# Patient Record
Sex: MALE | Race: WHITE
[De-identification: names, ages, dates, MRNs, and addresses within clinical notes are randomized per-mention and may not be internally consistent; named-entity substitution may affect disease eponyms.]

---

## 2017-10-31 ENCOUNTER — HOSPITAL ENCOUNTER (EMERGENCY)
Dept: HOSPITAL 45 - C.EDB | Age: 22
Discharge: HOME | End: 2017-10-31
Payer: COMMERCIAL

## 2017-10-31 VITALS
BODY MASS INDEX: 19.69 KG/M2 | HEIGHT: 74.02 IN | HEIGHT: 74.02 IN | WEIGHT: 153.44 LBS | BODY MASS INDEX: 19.69 KG/M2 | WEIGHT: 153.44 LBS

## 2017-10-31 VITALS — TEMPERATURE: 97.34 F

## 2017-10-31 VITALS — DIASTOLIC BLOOD PRESSURE: 86 MMHG | OXYGEN SATURATION: 100 % | SYSTOLIC BLOOD PRESSURE: 133 MMHG | HEART RATE: 71 BPM

## 2017-10-31 DIAGNOSIS — S52.102A: ICD-10-CM

## 2017-10-31 DIAGNOSIS — F90.9: ICD-10-CM
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
## 2017-10-31 DIAGNOSIS — Z79.899: ICD-10-CM
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
## 2017-10-31 DIAGNOSIS — S52.002A: Primary | ICD-10-CM

## 2017-10-31 NOTE — EMERGENCY ROOM VISIT NOTE
ED Visit Note


First contact with patient:  21:02


CHIEF COMPLAINT: "I broke my left forearm"





HISTORY OF PRESENT ILLNESS: This 22-year-old male patient presents to the 

emergency department, ambulatory, with 2 friends, complaining of pain in the 

left forearm.  The patient states he was playing ultimate [employer] approximately 

30 minutes PTA when his feet slid out from under him.  He states he landed on 

his left arm, describes it as a FOOSH injury.  The patient is able to move 

their wrist. The patient states the pain is severe on palpation and with 

attempts to move and 9/10. No laceration, no weakness. No numbness or tingling. 

The patient denies any other injury. The patient is able to move their fingers 

and elbow without difficulty. The patient has not had a previous fracture to 

this wrist or forearm. The patient has taken nothing for the pain. 





REVIEW OF SYSTEMS: A 6 system review of systems was performed with positives 

and pertinent negatives in the HPI. 





ALLERGIES: None





MEDICATIONS: Concerta, Ritalin





PMH: ADHD


 


SOCIAL HISTORY: The patient lives locally.  He is a [employer] student.  He 

lives with his roommates.  The patient denies drug, alcohol, tobacco use





PHYSICAL EXAM: Vital Signs: Reviewed Nurse's notes, vital signs stable. GENERAL

: Is is a 22-year-old white male, in no acute distress, but appears to be in 

pain, well-developed, well-nourished. NEURO: Alert and oriented to person place 

and time. Normal sensation to light and sharp touch. MUSCULOSKELETAL: There is 

no deformity of the left forearm. There is tenderness and edema over the mid 

shaft of the radius and ulna. There is no snuff box tenderness. Range of motion 

is limited due to pain. There is no tenderness of the elbow, hand or fingers. 

 strength 4/5. Radial pulse 2+. SKIN: Normal and intact. The hand is warm 

and well perfused with capillary refill less than 2 seconds. 





RADIOLOGY:


X-Ray Left Forearm:


L FOREARM 2 VIEWS ROUTINE





HISTORY:  22 years-old Male left forearm pain, possible fracture acute left


forearm pain status post injury.





COMPARISON: None available





TECHNIQUE: 3 views of the left forearm





FINDINGS: 


Acute comminuted fracture of the proximal diaphyseal radius is noted with


butterfly fracture fragment displaced medially x 3 mm and dorsally x 3 mm. There


is 4 mm medial displacement of the distal fracture fragment with approximately 2


mm distraction. Acute minimally comminuted fracture of the mid diaphyseal ulna


also noted with apex lateral angulation of 5 degrees, lateral displacement of 3


mm and dorsal displacement 5 mm. 6 mm positive ulnar variance.





Mild soft tissue swelling of the mid forearm. No opaque foreign body. 





IMPRESSION: 


1. Acute comminuted fracture of the proximal diaphyseal radius with mild


displacement as above.


2. Acute minimally comminuted fracture of the mid diaphyseal ulna with mild


displacement and angulation 











The above report was generated using voice recognition software. It may contain


grammatical, syntax or spelling errors.





.











Electronically signed by:  Jaxson Renee M.D.


10/31/2017 10:14 PM





Dictated Date/Time:  10/31/2017 10:10 PM








EMERGENCY DEPARTMENT COURSE: I examined the patient.  His sweatshirt was 

removed and the sleeve of his [place] shirt was cut with trauma micha to 

expose the skin of the forearm.  An IV was established and the patient was 

given 50 g of fentanyl and 4 mg Zofran.  An X-ray of the left forearm was 

reviewed by myself and radiologist and showed acute comminuted fractures of the 

radius and, as described above. A sugar tong Ortho-Glass splint was placed 

under my direction and the position was satisfactory. Neurovascular status 

rechecked and intact. The patient was given an arm sling.  He was given 5 mg 

OxyIR prior to discharge.  He was sent home with a home pack for OxyIR and 

instructions on when to take it.  The patient was given very clear, specific 

instructions on orthopedic follow-up in 1-2 days. The patient was discharged 

home in good condition. 





I attest that I have personally reviewed the patient's current medication list. 


Patient was found to have normal blood pressure on screening and does not 

require follow-up. 








DIFFERENTIAL DIAGNOSIS: Fracture, sprain, strain, contusion, and others





DIAGNOSIS: Radius and ulna fracture


Current/Historical Medications


Scheduled


Ascorbic Acid (Vitamin C), 500 MG PO DAILY


Cholecalciferol (Vitamin D3), 1,000 INTER.UNIT PO DAILY


Methylphenidate (Ritalin), 10 MG PO DAILY


Methylphenidate Hcl (Concerta), 72 MG PO QAM


Multivitamin (Multivitamin), 1 TAB PO DAILY





Scheduled PRN


Oxycodone Ir (Roxicodone Ir), 1-2 TAB PO Q4H PRN for Pain





Allergies


Coded Allergies:  


     No Known Allergies (Unverified , 10/31/17)





Vital Signs











  Date Time  Temp Pulse Resp B/P (MAP) Pulse Ox O2 Delivery O2 Flow Rate FiO2


 


10/31/17 22:50  71 16 133/86 100 Room Air  


 


10/31/17 21:51  86 18 127/71 93 Room Air  


 


10/31/17 20:53 36.3 82 24 136/89 95 Room Air  











Medications Administered











 Medications


  (Trade)  Dose


 Ordered  Sig/Mason


 Route  Start Time


 Stop Time Status Last Admin


Dose Admin


 


 Fentanyl Citrate


  (Fentanyl Inj)  50 mcg  NOW  STAT


 IV  10/31/17 21:13


 10/31/17 21:15 DC 10/31/17 21:28


50 MCG


 


 Ondansetron HCl


  (Zofran Inj)  4 mg  NOW  STAT


 IV  10/31/17 21:14


 10/31/17 21:15 DC 10/31/17 21:25


4 MG


 


 Oxycodone HCl


  (Roxicodone


 Immediate Rel Tab)  5 mg  NOW  STAT


 PO  10/31/17 22:38


 10/31/17 22:39 DC 10/31/17 22:53


5 MG


 


 Oxycodone HCl


  (Roxicodone


 Immediate Rel 5MG


 Home Pack)  1 homepack  UD  ONCE


 PO  10/31/17 22:45


 10/31/17 22:46 DC 10/31/17 22:53


1 HOMEPACK











Departure Information


Impression





 Primary Impression:  


 Radius and ulna proximal end fracture





Dispostion


Home / Self-Care





Condition


GOOD





Prescriptions





Oxycodone Ir (Roxicodone Ir) 5 Mg Tab


1-2 TAB PO Q4H Y for Pain, #15 TAB


   For Initial Treatment


   Prov: Trisha Martinez, PAADAM         10/31/17





Referrals


No Doctor, Assigned (PCP)








Goldy Carlos M.D.





Patient Instructions


ED Fx Forearm Radius Ulna No Redu Requ, My [facility]





Additional Instructions





ORTHOPEDIC INSTRUCTIONS:





DO NOT drive, drink alcohol, operate machinery, or perform dangerous activities 

today.  You were given medications in the ER that can affect your ability to 

safely function or operate a vehicle.





Oxycodone (OxyIR) 5mg: Take 1-2 pills every four to six hours as needed for 

breakthrough pain which is not controlled with OTC pain medications.  Avoid 

alcohol, operating machinery or dangerous equipment, working on ladders or roofs

, DRIVING, or situations where being under the influence may be dangerous.  It 

is recommended to use an over-the-counter stool softener such as Colace, 100mg 

twice daily while taking this medication to avoid constipation. 





Ibuprofen(Motrin, Advil) may be used for fever or pain.  Use 600mg every six 

hours as needed.  Take with food.  Avoid using more than 2400mg in a 24 hour 

period.  Do not use 2400mg per day for more than three consecutive days without 

physician direction.  Prolonged inappropriate use can lead to stomach upset or 

ulcers.  


(AND/OR)


Acetaminophen(Tylenol) may be used for fever or pain.  Use 1000mg every six 

hours as needed.  Avoid using more than 3000mg in a 24 hour period.  





Ice compresses for 20 minutes at a time four times daily for 2-3 days.





Use the sling as instructed.  Remove your arm from the sling 4-6 times a day 

and move all the joints around to keep them loose.





Rest and elevate your injury.





Do not get the splint wet.  If your splint feels excessively tight, you have 

worsening pain, develop numbness or tingling, or your digits appear blue, 

loosen the ace wrap. Then reapply the ace wrap gently without removing the 

splint.  If your symptoms are not quickly relieved return to the ER for re-

evaluation.





Return to the ER immediately for any numbness, tingling, severe pain, extreme 

swelling in the extremity or as needed.





Call [facility], [phone], tomorrow to arrange follow up for your 

injury.





Follow-up with your primary care physician in 2 to 3 days for a recheck of your 

current condition.





Problem Qualifiers








 Primary Impression:  


 Radius and ulna proximal end fracture


 Encounter type:  initial encounter  Fracture type:  closed  Laterality:  left  

Qualified Codes:  S52.002A - Unspecified fracture of upper end of left ulna, 

initial encounter for closed fracture; S52.102A - Unspecified fracture of upper 

end of left radius, initial encounter for closed fracture

## 2018-04-26 ENCOUNTER — HOSPITAL ENCOUNTER (EMERGENCY)
Dept: HOSPITAL 45 - C.EDB | Age: 23
Discharge: HOME | End: 2018-04-26
Payer: COMMERCIAL

## 2018-04-26 VITALS
HEIGHT: 74.02 IN | BODY MASS INDEX: 15.53 KG/M2 | HEIGHT: 74.02 IN | WEIGHT: 121.03 LBS | BODY MASS INDEX: 15.53 KG/M2 | WEIGHT: 121.03 LBS

## 2018-04-26 VITALS — TEMPERATURE: 98.78 F

## 2018-04-26 VITALS — HEART RATE: 81 BPM | OXYGEN SATURATION: 98 % | DIASTOLIC BLOOD PRESSURE: 72 MMHG | SYSTOLIC BLOOD PRESSURE: 133 MMHG

## 2018-04-26 DIAGNOSIS — M25.572: Primary | ICD-10-CM

## 2018-04-26 DIAGNOSIS — R60.0: ICD-10-CM

## 2018-04-26 NOTE — EMERGENCY ROOM VISIT NOTE
History


First contact with patient:  20:54


Chief Complaint:  ANKLE PAIN


Stated Complaint:  SPRAINED LEFT ANKLE, POSSIBLY BROKEN





History of Present Illness


The patient is a 22 year old male who presents to the Emergency Room via 

private vehicle with complaints of "sprained left ankle, possibly broken".  The 

patient states that today around 6:40 PM, he was participating in a practice 

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  

He states that he jumped up and when he landed the left side of the distal foot 

cleat caught the ground causing his left ankle to invert.  He notes pain 

overlying left lateral malleolus of which he rates as a 6/10.  No numbness/

tingling.  He has been icing this region.





Review of Systems


A complete 6-point Review of Systems was discussed with the patient, with 

pertinent positives and negatives listed in the History of Present Illness. All 

remaining Review of Systems questions can be considered negative unless 

otherwise specified.





Past Medical/Surgical History


Previous ankle injury.





Family History


Noncontributory.





Social History


Smoking Status:  Never Smoker


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





Current/Historical Medications


Scheduled


Ascorbic Acid (Vitamin C), 500 MG PO DAILY


Cholecalciferol (Vitamin D3), 1,000 INTER.UNIT PO DAILY


Methylphenidate (Ritalin), 10 MG PO DAILY


Methylphenidate Hcl (Concerta), 72 MG PO QAM


Multivitamin (Multivitamin), 1 TAB PO DAILY





Physical Exam


Vital Signs











  Date Time  Temp Pulse Resp B/P (MAP) Pulse Ox O2 Delivery O2 Flow Rate FiO2


 


4/26/18 22:06  81 18 133/72 98   


 


4/26/18 20:50 37.1 112 18 144/81 94 Room Air  











Physical Exam


VITAL SIGNS - Vital signs and nursing notes were reviewed.  Stable.


GENERAL -22-year-old male appearing his stated age who is in no acute distress. 

Communicates well with provider and answers questions appropriately.


SKIN -overlying the left lateral malleolus there is edema, and minimal skin 

erythema.  The integument is intact.  No evidence of open fracture.  No obvious 

bony deformity.  No other edema or erythema noted to the left ankle, or left 

lower extremity.


EXTREMITIES - No clubbing or peripheral cyanosis. No pretibial edema present.  

No tenderness to palpation overlying the left knee, left proximal or mid shin.  

There is actually no tenderness overlying the distal shin however overlying the 

left lateral malleolus region and even a small spot on the inferior aspect of 

the left medial malleolus region there is minimal tenderness.  Skin changes as 

above.  Minimal decrease in range of motion secondary to pain.  +5/5 strength 

noted in UE/LE bilaterally.  He is neurovascularly intact in this region.





Medical Decision & Procedures


ER Provider


Diagnostic Interpretation:


L ANKLE MIN 3 VIEWS ROUTINE





CLINICAL HISTORY: Left ankle pain s/p inversion injury    





COMPARISON: None.





DISCUSSION: Tiny avulsions from the tip of the medial and lateral malleolus. Age


is uncertain. Moderate soft tissue edema over the lateral malleolus. No


additional acute bony abnormality. Subtalar joint is intact.    





IMPRESSION: Tiny avulsions from both the tip of the medial as well as lateral


malleolus of uncertain age. Lateral soft tissue edema.

















The above report was generated using voice recognition software.  It may contain


grammatical, syntax or spelling errors.











Electronically signed by:  Basil Lopez M.D.


4/26/2018 9:20 PM





Dictated Date/Time:  4/26/2018 9:19 PM





Medical Decision


Patient was seen and evaluated as above in room D6. Review was performed of 

nursing notes and vital signs. After obtaining a thorough history and physical 

examination the above work up was performed.  X-ray was obtained.  He declined 

pain medication.  This reveals age indeterminant avulsions of the medial and 

lateral malleolar regions of the left ankle.  Paring this with clinical 

examination, I do suspect that he has an acute avulsion of the inferior aspect 

of the left lateral malleolus.  This is where the edema is present, and is 

consistent with mechanism of injury.  However, there is also a small avulsion 

just inferior to the left medial malleolus region.  Given that these are very 

tiny avulsions I do believe that a gel ankle splint is still appropriate in 

this case.  This was applied with adequate immobilization.  He is to maintain a 

nonweightbearing status until he follows with orthopedics which she is to call 

to follow-up/make an appointment within 2 weeks.  He was educated upon 

worrisome symptoms which to return.   The patient was educated upon management, 

had questions answered prior to discharge, and was discharged home in good 

condition.





In the evaluation and treatment of this patient, the following differential 

diagnoses were considered: Ankle Fracture, Ankle Sprain, Distal Fibula Fracture

, Distal Tibia Fracture, Foot Fracture, [place] Fracture.





Impression





 Primary Impression:  


 Left ankle pain





Departure Information


Dispostion


Home / Self-Care





Condition


GOOD





Referrals


No Doctor, Assigned (PCP)








Joey Antonio D.O.





Patient Instructions


My [facility]





Additional Instructions





You have been treated in the Emergency Department for a left Ankle injury





For pain control, you can use the following over-the-counter medicines:





- Regular strength (325mg/tab) Tylenol (acetaminophen) 2 tabs every 4-6 hours 

as needed. Do not exceed 12 tablets in a 24 hour period. Avoid taking more than 

3 grams (3000 mg) of Tylenol per day. This includes any other sources of 

acetaminophen you may take on a regular basis.





- Regular strength (200 mg/tab) Advil (ibuprofen) 1-2 tabs every 4-6 hours as 

needed. Do not exceed a dose of 3200 mg per day.





If this is a recent injury (<24 hrs), ice can be applied to the area of pain 

for the first 3 days to help decrease pain and inflammation.





You have been provided the number for an Orthopaedic Surgeon. You should call 

this number as soon as possible to establish a follow-up visit from today's 

Emergency Department visit.





Keep the ankle brace/splint in place until cleared by Orthopedics. Use the 

crutches you have been provided to keep ALL weight off of the ankle until 

weight bearing is tolerable.





Return to the Emergency Department if your current symptoms worsen despite 

treatment course outlined above, or if you develop any of the following symptoms

: intractable pain despite aforementioned treatment course or new onset of 

numbness or tingling of the foot.








XRAY:





L ANKLE MIN 3 VIEWS ROUTINE





CLINICAL HISTORY: Left ankle pain s/p inversion injury    





COMPARISON: None.





DISCUSSION: Tiny avulsions from the tip of the medial and lateral malleolus. Age


is uncertain. Moderate soft tissue edema over the lateral malleolus. No


additional acute bony abnormality. Subtalar joint is intact.    





IMPRESSION: Tiny avulsions from both the tip of the medial as well as lateral


malleolus of uncertain age. Lateral soft tissue edema.

## 2018-04-26 NOTE — DIAGNOSTIC IMAGING REPORT
L ANKLE MIN 3 VIEWS ROUTINE



CLINICAL HISTORY: Left ankle pain s/p inversion injury    



COMPARISON: None.



DISCUSSION: Tiny avulsions from the tip of the medial and lateral malleolus. Age

is uncertain. Moderate soft tissue edema over the lateral malleolus. No

additional acute bony abnormality. Subtalar joint is intact.    



IMPRESSION: Tiny avulsions from both the tip of the medial as well as lateral

malleolus of uncertain age. Lateral soft tissue edema.











The above report was generated using voice recognition software.  It may contain

grammatical, syntax or spelling errors.







Electronically signed by:  Basil Lopez M.D.

4/26/2018 9:20 PM



Dictated Date/Time:  4/26/2018 9:19 PM